# Patient Record
Sex: FEMALE | Race: WHITE
[De-identification: names, ages, dates, MRNs, and addresses within clinical notes are randomized per-mention and may not be internally consistent; named-entity substitution may affect disease eponyms.]

---

## 2018-01-12 ENCOUNTER — HOSPITAL ENCOUNTER (OUTPATIENT)
Dept: HOSPITAL 47 - ORWHC2ENDO | Age: 58
Discharge: HOME | End: 2018-01-12
Payer: COMMERCIAL

## 2018-01-12 VITALS — HEART RATE: 81 BPM | SYSTOLIC BLOOD PRESSURE: 109 MMHG | DIASTOLIC BLOOD PRESSURE: 72 MMHG

## 2018-01-12 VITALS — TEMPERATURE: 98.5 F | RESPIRATION RATE: 16 BRPM

## 2018-01-12 VITALS — BODY MASS INDEX: 27.1 KG/M2

## 2018-01-12 DIAGNOSIS — Z79.899: ICD-10-CM

## 2018-01-12 DIAGNOSIS — E78.5: ICD-10-CM

## 2018-01-12 DIAGNOSIS — Z12.11: Primary | ICD-10-CM

## 2018-01-12 DIAGNOSIS — Z79.51: ICD-10-CM

## 2018-01-12 NOTE — P.PCN
Date of Procedure: 01/12/18


Procedure(s) Performed: 


BRIEF HISTORY: Patient is a 58-year-old pleasant white female, scheduled for an 

elective colonoscopy as a part of screening for colorectal neoplasia.





PROCEDURE PERFORMED: Colonoscopy. 





PREOPERATIVE DIAGNOSIS: Screening for colon cancer. 





IV sedation per Anesthesia. 





PROCEDURE: After informed consent was obtained, the patient, was brought into 

the endoscopy unit. IV sedation was administered by Anesthesia under continuous 

monitoring.  Digital rectal examination was normal. Initially the Olympus CF-

160 flexible video colonoscope was then inserted in the rectum, gradually 

advanced into the cecum without any difficulty. Careful examination was 

performed as the scope was gradually being withdrawn. Ileocecal valve and the 

appendiceal orifice were visualized and appeared normal.  Prep was good.. 

Mucosa of the cecum, ascending colon, transverse colon, descending colon, 

sigmoid colon, and rectum appeared normal. Retroflexion was performed in the 

rectum and no lesions were seen. The patient tolerated the procedure well. 





IMPRESSION: 


Normal-appearing colon from rectum to cecum with no evidence of colorectal 

neoplasia.





RECOMMENDATIONS:  Findings of this examination were discussed with the patient 

as well as a family.  She was advised to have a repeat screening colonoscopy in 

10 years.